# Patient Record
(demographics unavailable — no encounter records)

---

## 2020-05-28 NOTE — PCM.POSTAN
POST ANESTHESIA ASSESSMENT





- MENTAL STATUS


Mental Status: Somnolent





- VITAL SIGNS


Vital Signs: 


1st VS st in   PACU 96.8F    HR 62   RR 11   /79   SpO2 97% on 4 L O2 


 


 Last Vital Signs











Temp  97.0 F   05/28/20 10:45


 


Pulse  61   05/28/20 10:45


 


Resp  18   05/28/20 10:45


 


BP  119/83   05/28/20 10:45


 


Pulse Ox  97   05/28/20 10:45














- RESPIRATORY


Respiratory Status: Respiratory Rate WNL, Airway Patent, O2 Saturation Stable





- CARDIOVASCULAR


CV Status: Pulse Rate WNL, Blood Pressure Stable





- GASTROINTESTINAL


GI Status: No Symptoms





- PAIN


Pain Score: 0





- POST OP HYDRATION


Hydration Status: Adequate & Stable

## 2020-05-28 NOTE — PCM.PRNOTE
- Free Text/Narrative


Note: 





Postoperative regional pain control requested by surgeon.


Pre-op Dx: Right Rotator cuff tear 


Surgical procedure: Rt shoulder arthroscopy with Rotator cuff repair 


Procedure: Rt Interscalene block with U/S guidance


Requesting physician: Dr. Jono Arriola


Risks and benefits discussed with the patient preoperatively including infection

, bleeding, incomplete or failed block, possible nerve damage, local anesthetic 

toxicity. Chart reviewed, VS stable. Permit signed.


Patient in preoperative room, stable , alert and awake. Time out performed at 08

:33. Oxygen 3L via NC. Right side of the neck was prepped with Chloraprep x 1 

and allowed to dry. Midazolam IV 4 mg (total, in incremental doses) were given. 

Under aseptic technique, the brachial plexus was identified under ultrasound 

prior to needle insertion. Local infiltration with 2mls of 1% Lidocaine. 2" 

Stimuplex needle #22 G was inserted under US guidance. Neuromuscular response 

of biceps contraction and forearm twitching elicited at 0.6 mA. Under direct 

visualization of needle tip the injection of 0.5% Ropivacaine with 1:200k 

epinephrine and 100 mcg of Clonidine, total of 30 mls in divided doses, 

maintaining negative aspiration was completed without problems. No local 

anesthetic toxicity was noted. Patient is awake, stable and tolerated the 

procedure well.


Please see the attached U/S images


Time: 08:33 - 08:43

## 2020-05-28 NOTE — PCM.PREANE
Preanesthetic Assessment





- Procedure


Proposed Procedure: 


Right Shoulder SVA with Rotator Cuff Repair





- Anesthesia/Transfusion/Family Hx


Anesthesia History: Prior Anesthesia Without Reaction


Family History of Anesthesia Reaction: No


Transfusion History: No Prior Transfusion(s)


Type of Transfusion Reactions: Reports: Unknown





- Review of Systems


General: No Symptoms


Pulmonary: Other (Sleep Apnea with CPAP use. )


Cardiovascular: No Symptoms


Gastrointestinal: No Symptoms


Neurological: No Symptoms


Other: Reports: None (Recovered from ETOH abuse. No ETOH since 2002. )





- Physical Assessment


NPO Status Date: 05/27/20


NPO Status Time: 17:00


Weight: 84.6 kg


ASA Class: 2


Mental Status: Alert & Oriented x3


Airway Class: Mallampati = 1


Dentition: Reports: Implants (Front Upper )


Thyro-Mental Finger Breadths: 3


Mouth Opening Finger Breadths: 3


ROM/Head Extension: Full


Lungs: Clear to Auscultation, Normal Respiratory Effort


Cardiovascular: Regular Rhythm, Bradycardia





- Lab


Values: 





 Laboratory Last Values











SARS Virus RNA (PCR)  Negative  (NEGATIVE)   05/26/20  10:52    


 


MRSA (PCR)  Negative   05/26/20  11:07    














- Allergies


Allergies/Adverse Reactions: 


 Allergies











Allergy/AdvReac Type Severity Reaction Status Date / Time


 


No Known Allergies Allergy   Verified 06/09/16 22:05














- Acknowledgements


Anesthesia Type Planned: General Anesthesia, Regional Block (Interscalene Nerve 

Block Requested per Dr. Manzo for post op pain control. )


Pt an Appropriate Candidate for the Planned Anesthesia: Yes


Alternatives and Risks of Anesthesia Discussed w Pt/Guardian: Yes


Pt/Guardian Understands and Agrees with Anesthesia Plan: Yes





PreAnesthesia Questionnaire





- Past Health History


Medical/Surgical History: Denies Medical/Surgical History





- Infectious Disease History


Infectious Disease History: Reports: MRSA





- Past Surgical History


Musculoskeletal Surgical History: Reports: Other (See Below)





- HOME MEDS


Home Medications: 


 Home Meds





Non-Formulary Medication [NF Drug] 1 each .XX DAILY 06/10/16 [History]


Aspirin [Ecotrin] 325 mg PO BID  tab.ec 06/11/16 [Rx]


Bisacodyl [Dulcolax] 5 mg PO DAILY PRN #0 tablet 06/11/16 [Rx]


Docusate Sodium [Colace] 100 mg PO BID PRN #0 cap 06/11/16 [Rx]


Famotidine [Pepcid] 20 mg PO Q12H  tablet 06/11/16 [Rx]


Magnesium Hydroxide [Milk of Magnesia] 30 ml PO BID PRN #0 cup 06/11/16 [Rx]


Sennosides [Senna] 8.6 mg PO BID PRN #0 tablet 06/11/16 [Rx]


diphenhydrAMINE [Benadryl] 25 mg PO Q4H PRN #0 tablet 06/11/16 [Rx]


Acetaminophen/HYDROcodone [Norco 325-5 MG] 1 - 2 tab PO Q6H PRN #40 tablet 05/28 /20 [Rx]


Cyclobenzaprine [Flexeril] 10 mg PO Q12H PRN #20 tab 05/28/20 [Rx]











- CURRENT (IN HOUSE) MEDS


Current Meds: 





 Current Medications





Epinephrine HCl (Adrenalin)  3 mg .XX ONETIME ONE


   Stop: 05/28/20 09:01


Lactated Ringer's (Ringers, Lactated)  1,000 mls @ 125 mls/hr IV ASDIRECTED FRANSICO


   Stop: 05/28/20 23:00


Lidocaine/Sodium Bicarbonate (Buffered Lidocaine 1% In Ns 8.4%)  0.25 ml IDERM 

ONETIME PRN


   PRN Reason: Prior to IV Start


   Stop: 05/28/20 18:00


Sodium Chloride (Saline Flush)  10 ml FLUSH ASDIRECTED PRN


   PRN Reason: Keep Vein Open


   Stop: 05/28/20 18:00





Discontinued Medications





Clonidine HCl (Duraclon) Confirm Administered Dose 1,000 mcg .ROUTE .STK-MED ONE


   Stop: 05/28/20 07:43


Epinephrine HCl (Adrenalin) Confirm Administered Dose 1 mg .ROUTE .STK-MED ONE


   Stop: 05/28/20 07:40


Fentanyl (Sublimaze) Confirm Administered Dose 100 mcg .ROUTE .STK-MED ONE


   Stop: 05/28/20 07:44


Midazolam HCl (Versed 1 Mg/Ml) Confirm Administered Dose 4 mg .ROUTE .STK-MED 

ONE


   Stop: 05/28/20 07:43


Ropivacaine (Naropin 0.5%) Confirm Administered Dose 30 ml .ROUTE .STK-MED ONE


   Stop: 05/28/20 07:40

## 2020-06-01 NOTE — PCM.OPNOTE
- General Post-Op/Procedure Note


Date of Surgery/Procedure: 05/28/20


Operative Procedure(s): right shoulder video arthroscopy with medium rotator 

cuff repair, subacromial decompression and extensive debridement with biceps 

tenotomy


Pre Op Diagnosis: right shoulder rotator cuff tear with impingement with biceps 

tendinopathy


Post-Op Diagnosis: Same


Anesthesia Technique: General ET Tube, Regional Block


Primary Surgeon: Jono Manzo


Anesthesia Provider: Joshua Barba


Assistant: Ebony Kim


EBL in mLs: 5


Complications: None


Condition: Good

## 2020-06-02 NOTE — OR
DATE OF OPERATION:  05/28/2020

 

SURGEON:  Jono Manzo MD

 

OPERATION PERFORMED:  Right shoulder video arthroscopy with medium rotator cuff

repair, subacromial decompression, and extensive debridement with biceps

tenotomy.

 

PREOPERATIVE DIAGNOSIS:

Right shoulder rotator cuff tear with impingement and biceps tendinopathy.

 

POSTOPERATIVE DIAGNOSIS:

Right shoulder rotator cuff tear with impingement and biceps tendinopathy.

 

ANESTHESIA:

Technique:  General endotracheal intubation with regional interscalene block.

 

ANESTHESIA PROVIDER:

Rocío Carrillo.

 

ASSISTANT:

Ebony Kim PA-C

 

ESTIMATED BLOOD LOSS:

Less than 5 mL.

 

COMPLICATIONS:

None.

 

CONDITION:

Stable.

 

DESCRIPTION OF PROCEDURE:

The patient was identified in the preop holding area.  Proper site was marked

and identified by the surgeon.  The patient was taken back to the operating

theater where after adequate anesthesia, the patient was placed in the lazy left

lateral decubitus position.  Wedge was placed posteriorly.  The patient was

secured to the table.  Right upper extremity was then sterilely prepped and

draped in the usual sterile fashion.  OR time-out was performed.  The patient

received 2 g IV Ancef.  12 pounds traction was applied to the right upper

extremity.  At this time, a standard posterior incision was made.  Scope trocar

was introduced to the glenohumeral joint.  The patient was noted to have no

chondromalacia.  With the use of a spinal needle, anterior portal was created

with outside in technique.  The biceps tendon was noted to have significant

fraying at its attachment as well as tendinopathy.  The subscapularis tendon was

intact.  The patient was noted to have a full-thickness rotator cuff tear in the

midportion of the supraspinatus.  At this time, a biceps tenotomy was performed

and a significant debridement of any fraying of the labrum as well as synovitis

was done at this time as well.  Once this was completed, attention was turned to

the subacromial space.  The patient was noted to have a significant amount of

bursitis and extensive debridement was done of the subacromial space.  The tear

was identified and with use of a resector as well as a alice, a good bony

bleeding bed was prepared for repair of the rotator cuff.  An Arthrex all-suture

anchor was then placed medially and it was triple-loaded.  At this time, the six

limbs of the tape were started anteriorly and brought all the way past

posteriorly and had good convergence on the tear.  At this time, one lateral row

Sarah anchor was placed anteriorly and then was placed posteriorly, bringing

out three limbs in each of those for cross pattern across the tear site with

good compression and complete restoration of the footprint of the supraspinatus.

It was found to have watertight repair.  An extensive debridement of the

undersurface of the acromion was done and then an acromioplasty was done from

the lateral portal which was created earlier.  This was brought back to a smooth

border with the posterior rim of the acromion.  Excess saline was drained from

the joint.  3-0 nylon suture was used for closure of skin.  The patient was

placed in a sterile soft dressing and a pillow sling and sent to the PACU in

stable condition.

 

DD:  06/02/2020 10:09:34

DT:  06/02/2020 10:43:26  PREM

Job #:  [id number]

## 2020-09-10 NOTE — PCM.PREANE
Preanesthetic Assessment





- Procedure


Proposed Procedure: 





hardware removal right patella








- Anesthesia/Transfusion/Family Hx


Anesthesia History: Prior Anesthesia Without Reaction


Family History of Anesthesia Reaction: No


Transfusion History: No Prior Transfusion(s)


Type of Transfusion Reactions: Reports: Unknown





- Review of Systems


General: No Symptoms


Pulmonary: No Symptoms


Cardiovascular: No Symptoms


Gastrointestinal: No Symptoms


Neurological: No Symptoms


Other: Reports: None





- Physical Assessment


NPO Status Date: 09/09/20


NPO Status Time: 00:00


Vital Signs: 





                                Last Vital Signs











Temp  36.3 C   09/10/20 08:05


 


Pulse  60   09/10/20 08:05


 


Resp  16   09/10/20 08:05


 


BP  134/93 H  09/10/20 08:05


 


Pulse Ox  97   09/10/20 08:05











Height: 1.73 m


Weight: 85.729 kg


ASA Class: 2


Mental Status: Alert & Oriented x3


Airway Class: Mallampati = 1


Dentition: Reports: Normal Dentition, Crown(s), Implants (front top)


Thyro-Mental Finger Breadths: 3


Mouth Opening Finger Breadths: 3


ROM/Head Extension: Full


Lungs: Clear to Auscultation, Normal Respiratory Effort


Cardiovascular: Regular Rate, Regular Rhythm





- Lab


Values: 





                             Laboratory Last Values











SARS Virus RNA (PCR)  Negative  (NEGATIVE)   09/08/20  11:25    


 


MRSA (PCR)  Negative   09/08/20  11:14    














- Imaging/EKG


Impressions: 





EKG SR rate 59








- Allergies


Allergies/Adverse Reactions: 


                                    Allergies











Allergy/AdvReac Type Severity Reaction Status Date / Time


 


No Known Allergies Allergy   Verified 09/09/20 14:11














- Blood


Blood Available: No


Product(s) Available: None





- Anesthesia Plan


Pre-Op Medication Ordered: None





- Acknowledgements


Anesthesia Type Planned: MAC


Pt an Appropriate Candidate for the Planned Anesthesia: Yes


Alternatives and Risks of Anesthesia Discussed w Pt/Guardian: Yes


Pt/Guardian Understands and Agrees with Anesthesia Plan: Yes





PreAnesthesia Questionnaire





- Past Health History


Medical/Surgical History: Denies Medical/Surgical History


HEENT History: Reports: None


Cardiovascular History: Reports: None


Respiratory History: Reports: Sleep Apnea


Gastrointestinal History: Reports: None


Genitourinary History: Reports: None


OB/GYN History: Reports: None


Musculoskeletal History: Reports: None


Neurological History: Reports: None


Psychiatric History: Reports: None


Endocrine/Metabolic History: Reports: None


Hematologic History: Reports: None


Immunologic History: Reports: None


Oncologic (Cancer) History: Reports: None


Dermatologic History: Reports: None





- Infectious Disease History


Infectious Disease History: Reports: None





- Past Surgical History


Head Surgeries/Procedures: Reports: None


HEENT Surgical History: Reports: None


Cardiovascular Surgical History: Reports: None


Respiratory Surgical History: Reports: None


GI Surgical History: Reports: Colonoscopy


Female  Surgical History: Reports: None


Male  Surgical History: Reports: None


Endocrine Surgical History: Reports: None


Neurological Surgical History: Reports: None


Musculoskeletal Surgical History: Reports: Shoulder Surgery, Other (See Below)


Other Musculoskeletal Surgeries/Procedures:: Right knee surgery in 1981, torn 

meniscus/ACL. Left knee surgery in 1978, torn meniscus. Left hand surgery, hand 

caught in table saw with cut tendons repaired


Oncologic Surgical History: Reports: None


Dermatological Surgical History: Reports: None





- SUBSTANCE USE


Smoking Status *Q: Never Smoker


Tobacco Use Within Last Twelve Months: No


Second Hand Smoke Exposure: Yes


Days Per Week of Alcohol Use: 0


Number of Drinks Per Day: 0


Total Drinks Per Week: 0


Recreational Drug Use History: No





- HOME MEDS


Home Medications: 


                                    Home Meds





Cholecalciferol (Vitamin D3) [Vitamin D3] 1,000 unit PO DAILY 09/09/20 [History]


Cyanocobalamin (Vitamin B12) [Vitamin B12] 1,000 mcg PO DAILY 09/09/20 [History]


Lactobacillus Combo No.10 [Probiotic] 1 cap PO DAILY 09/09/20 [History]


Testosterone Vitamin 1 dose PO DAILY 09/09/20 [History]


Zinc 50 mg PO DAILY 09/09/20 [History]


Acetaminophen/HYDROcodone [Norco 325-5 MG] 1 - 2 tab PO Q6H PRN #30 tablet 

09/10/20 [Rx]


Aspirin 325 mg PO BID #84 tab 09/10/20 [Rx]











- CURRENT (IN HOUSE) MEDS


Current Meds: 





                               Current Medications





Lactated Ringer's (Ringers, Lactated)  1,000 mls @ 125 mls/hr IV ASDIRECTED FRANSICO


   Stop: 09/10/20 23:00


   Last Admin: 09/10/20 08:20 Dose:  125 mls/hr


   Documented by: 


Lidocaine/Sodium Bicarbonate (Buffered Lidocaine 1% In Ns 8.4%)  0.25 ml IDERM 

ONETIME PRN


   PRN Reason: Prior to IV Start


   Stop: 09/10/20 18:00


   Last Admin: 09/10/20 08:20 Dose:  0.25 ml


   Documented by: 


Sodium Chloride (Saline Flush)  10 ml FLUSH ASDIRECTED PRN


   PRN Reason: Keep Vein Open


   Stop: 09/10/20 18:00





Discontinued Medications





Fentanyl (Sublimaze) Confirm Administered Dose 100 mcg .ROUTE .STK-MED ONE


   Stop: 09/10/20 07:56


Lidocaine HCl (Xylocaine-Mpf 1%) Confirm Administered Dose 4 mls @ as directed 

.ROUTE .STK-MED ONE


   Stop: 09/10/20 07:55


Midazolam HCl (Versed 1 Mg/Ml) Confirm Administered Dose 2 mg .ROUTE .STK-MED 

ONE


   Stop: 09/10/20 07:56


Propofol (Diprivan  20 Ml) Confirm Administered Dose 200 mg .ROUTE .STK-MED ONE


   Stop: 09/10/20 07:55

## 2020-09-10 NOTE — [id number]
Post Anesthesia Note





- EVALUATION WITHIN 48HRS OF ANESTHETIC


Vital Signs in Normal Range: Yes


Patient Participated in Evaluation: Yes


Respiratory Function Stable: Yes


Airway Patent: Yes


Cardiovascular Function Stable: Yes


Hydration Status Stable: Yes


Pain Control Satisfactory: Yes


Nausea and Vomiting Control Satisfactory: Yes


Mental Status Recovered: Yes


Vital Signs: 


                                Last Vital Signs











Temp  36.3 C   09/10/20 08:05


 


Pulse  60   09/10/20 08:05


 


Resp  16   09/10/20 08:05


 


BP  134/93 H  09/10/20 08:05


 


Pulse Ox  97   09/10/20 08:05














- COMMENTS/OBSERVATIONS


Free Text/Narrative:: 





no anesthesia complications noted

## 2020-09-11 NOTE — CR
Patella: AP and lateral views were obtained utilizing C-arm device 

during reduction and fixation of patellar fracture.

 

Final film shows 2 cannulated screws and third transverse screw being 

in place.  This patellar fracture shows final position that is near 

anatomic.  Fluoroscopy time is given as to 39.7 seconds.

 

Degenerative change noted within the knee.

 

Impression:

1.  Procedural study as noted above.

2.  Degenerative change within the knee.

 

Diagnostic code #2

 

This report was dictated in MDT

## 2020-09-16 NOTE — OR
DATE OF OPERATION:  09/10/2020

 

SURGEON:  Jono Manzo MD

 

OPERATION PERFORMED:  Deep hardware removal, right patella.

 

PREOPERATIVE DIAGNOSIS:

Painful hardware, right patella.

 

POSTOPERATIVE DIAGNOSIS:

Painful hardware, right patella.

 

ANESTHESIA:

Local MAC.

 

ASSISTANT:

None.

 

ANESTHESIA PROVIDER:

Duke Chung CRNA

 

ESTIMATED BLOOD LOSS:

10 mL.

 

COMPLICATIONS:

None.

 

CONDITION:

Stable.

 

DESCRIPTION OF PROCEDURE:

The patient was identified in the preoperative holding area.  Proper site was

marked and identified by surgeon.  The patient was taken back to the operative

theater, where after adequate anesthesia, the patient's right lower extremity

was sterilely prepped and draped in the usual sterile fashion.  OR time-out was

performed.  The patient received 2 g of IV Ancef at this time.  Preoperatively,

the patient and I had discussed that we would not open the entire incision.  We

would try just to do percutaneous removal of the screws and that we would not be

removing the smaller 2.7 screw as it is deeper and would not be in the way of

the patellar button when we do our total knee arthroplasty.  So, at this time,

utilizing C-arm fluoroscopy during the entire procedure, I made a small incision

near the inferior pole of the patella, near the medial screw.  A guidewire was

then placed through the screw.  I was able to then remove the screw without

difficulty with no remaining hardware of the medial screw.  At this time, small

percutaneous incision was made laterally.  A guide pin was then placed again

through the screw.  At this point, though, there was significant bony overgrowth

and there would have been significant need for removal of patella as well as a

larger incision to remove that screw.  At this point, it was decided that doing

unnecessary damage before total knee arthroplasty would not be warranted, so

that we would just remove the remaining screws when we do the total knee

arthroplasty at that time as they will be more visible.  The patient and his

wife after the anesthesia wore off were in understanding.  At this time,

adequate saline was irrigated through the wounds.  2-0 Vicryl was used

subcutaneously, and nylon was used for closure of the skin.  The patient had a

sterile soft dressing applied and was sent to the PACU in stable condition and

tolerated the procedure well.

 

DD:  09/16/2020 08:45:00

DT:  09/16/2020 09:22:34  MMODAL

Job #:  [id number]

## 2020-09-16 NOTE — PCM.OPNOTE
- General Post-Op/Procedure Note


Date of Surgery/Procedure: 09/09/20


Operative Procedure(s): deep hardware removal right patella


Pre Op Diagnosis: painful hardware right patella


Post-Op Diagnosis: Same


Anesthesia Technique: Local, MAC


Primary Surgeon: Jono Manzo


Anesthesia Provider: Timothy Schmidt


EBKEON in mLs: 10


Complications: None


Condition: Good